# Patient Record
Sex: FEMALE | Race: BLACK OR AFRICAN AMERICAN
[De-identification: names, ages, dates, MRNs, and addresses within clinical notes are randomized per-mention and may not be internally consistent; named-entity substitution may affect disease eponyms.]

---

## 2018-03-13 ENCOUNTER — HOSPITAL ENCOUNTER (EMERGENCY)
Dept: HOSPITAL 72 - EMR | Age: 53
Discharge: HOME | End: 2018-03-13
Payer: MEDICARE

## 2018-03-13 VITALS — DIASTOLIC BLOOD PRESSURE: 68 MMHG | SYSTOLIC BLOOD PRESSURE: 120 MMHG

## 2018-03-13 VITALS — HEIGHT: 72 IN | WEIGHT: 185 LBS | BODY MASS INDEX: 25.06 KG/M2

## 2018-03-13 VITALS — DIASTOLIC BLOOD PRESSURE: 68 MMHG | SYSTOLIC BLOOD PRESSURE: 126 MMHG

## 2018-03-13 DIAGNOSIS — J40: Primary | ICD-10-CM

## 2018-03-13 DIAGNOSIS — Z86.73: ICD-10-CM

## 2018-03-13 DIAGNOSIS — Z88.0: ICD-10-CM

## 2018-03-13 LAB
ADD MANUAL DIFF: NO
ALBUMIN SERPL-MCNC: 3.7 G/DL (ref 3.4–5)
ALBUMIN/GLOB SERPL: 0.9 {RATIO} (ref 1–2.7)
ALP SERPL-CCNC: 51 U/L (ref 46–116)
ALT SERPL-CCNC: 27 U/L (ref 12–78)
ANION GAP SERPL CALC-SCNC: 11 MMOL/L (ref 5–15)
AST SERPL-CCNC: 21 U/L (ref 15–37)
BASOPHILS NFR BLD AUTO: 1.1 % (ref 0–2)
BILIRUB SERPL-MCNC: 0.4 MG/DL (ref 0.2–1)
BUN SERPL-MCNC: 20 MG/DL (ref 7–18)
CALCIUM SERPL-MCNC: 9.5 MG/DL (ref 8.5–10.1)
CHLORIDE SERPL-SCNC: 104 MMOL/L (ref 98–107)
CK MB SERPL-MCNC: 1.2 NG/ML (ref 0–3.6)
CK SERPL-CCNC: 68 U/L (ref 26–308)
CO2 SERPL-SCNC: 26 MMOL/L (ref 21–32)
CREAT SERPL-MCNC: 1.1 MG/DL (ref 0.55–1.3)
EOSINOPHIL NFR BLD AUTO: 2.2 % (ref 0–3)
ERYTHROCYTE [DISTWIDTH] IN BLOOD BY AUTOMATED COUNT: 12.2 % (ref 11.6–14.8)
GLOBULIN SER-MCNC: 4 G/DL
HCT VFR BLD CALC: 38.6 % (ref 37–47)
HGB BLD-MCNC: 12.7 G/DL (ref 12–16)
LYMPHOCYTES NFR BLD AUTO: 39.6 % (ref 20–45)
MCV RBC AUTO: 88 FL (ref 80–99)
MONOCYTES NFR BLD AUTO: 11.7 % (ref 1–10)
NEUTROPHILS NFR BLD AUTO: 45.5 % (ref 45–75)
PLATELET # BLD: 164 K/UL (ref 150–450)
POTASSIUM SERPL-SCNC: 3.4 MMOL/L (ref 3.5–5.1)
RBC # BLD AUTO: 4.36 M/UL (ref 4.2–5.4)
SODIUM SERPL-SCNC: 140 MMOL/L (ref 136–145)
WBC # BLD AUTO: 5.4 K/UL (ref 4.8–10.8)

## 2018-03-13 PROCEDURE — 71045 X-RAY EXAM CHEST 1 VIEW: CPT

## 2018-03-13 PROCEDURE — 82550 ASSAY OF CK (CPK): CPT

## 2018-03-13 PROCEDURE — 94640 AIRWAY INHALATION TREATMENT: CPT

## 2018-03-13 PROCEDURE — 99284 EMERGENCY DEPT VISIT MOD MDM: CPT

## 2018-03-13 PROCEDURE — 83880 ASSAY OF NATRIURETIC PEPTIDE: CPT

## 2018-03-13 PROCEDURE — 93005 ELECTROCARDIOGRAM TRACING: CPT

## 2018-03-13 PROCEDURE — 82553 CREATINE MB FRACTION: CPT

## 2018-03-13 PROCEDURE — 84484 ASSAY OF TROPONIN QUANT: CPT

## 2018-03-13 PROCEDURE — 36415 COLL VENOUS BLD VENIPUNCTURE: CPT

## 2018-03-13 PROCEDURE — 85025 COMPLETE CBC W/AUTO DIFF WBC: CPT

## 2018-03-13 PROCEDURE — 80053 COMPREHEN METABOLIC PANEL: CPT

## 2018-03-13 NOTE — EMERGENCY ROOM REPORT
History of Present Illness


General


Source:  Patient, Family Member, EMS





Present Illness


HPI


58-year-old female brought in by EMS with shortness of breath for 2 weeks.  

Associated with dry cough, rhinorrhea, nasal congestion.  No fevers or chills.  

No history of smoking, CAD, COPD, asthma, or CHF.  Patient states she went to 

urgent care 1 week ago and was diagnosed with "bronchitis " and was given 

albuterol inhaler and some other medications which she does not remember.


No history of PE or ACS - no recent immobilization, recent surgery, no oral 

birth control, non-smoker


Allergies:  


Coded Allergies:  


     PENICILLINS (Verified  Allergy, Unknown, 3/13/18)





Patient History


Past Medical History:  CVA/TIA


Past Surgical History:  none


Pertinent Family History:  none


Social History:  Denies: smoking, alcohol use, drug use


Pregnant Now:  No


Immunizations:  UTD


Reviewed Nursing Documentation:  PMH: Agreed, PSxH: Agreed





Review of Systems


All Other Systems:  negative except mentioned in HPI





Physical Exam


Sp02 EP Interpretation:  reviewed, normal


General Appearance:  normal inspection, well appearing, no apparent distress, 

alert, GCS 15, non-toxic


Head:  normocephalic, atraumatic


Eyes:  bilateral eye PERRL, bilateral eye EOMI


ENT:  normal ENT inspection, hearing grossly normal, normal pharynx, no 

angioedema, normal voice, TMs + canals normal, uvula midline, moist mucus 

membranes


Neck:  normal inspection, full range of motion, supple, thyroid normal, no 

meningismus, no bony tend, other - Trach scar


Respiratory:  normal inspection, lungs clear, normal breath sounds, no rhonchi, 

no respiratory distress, no retraction, no accessory muscle use, no wheezing, 

speaking full sentences


Cardiovascular #1:  regular rate, rhythm, no edema, no JVD, normal capillary 

refill


Gastrointestinal:  normal inspection, normal bowel sounds, non tender, soft, no 

mass, no peritonitis, non-distended, no guarding, no hernia, no pulsatile mass


Genitourinary:  no CVA tenderness


Musculoskeletal:  normal inspection, back normal, normal range of motion, no 

calf tenderness, pelvis stable, Kwasi's Sign negative


Neurologic:  normal inspection, alert, oriented x3, responsive, CNs III-XII nml 

as tested, motor strength/tone normal, cerebellar normal, normal gait, speech 

normal


Psychiatric:  normal inspection, judgement/insight normal, mood/affect normal, 

no suicidal/homicidal ideation, no delusions


Skin:  normal inspection, normal color, no rash


Lymphatic:  normal inspection, no adenopathy





Medical Decision Making


Diagnostic Impression:  


 Primary Impression:  


 SOB (shortness of breath)


 Additional Impression:  


 Bronchitis


ER Course


VSS, afebrile


DDx includes bronchitis more likely


ACS PE less likely given associated cough, URI symptoms, symptoms lasting 2 

weeks





ECG:


CXR: No PNA, PTX, cardiomegaly or CHF


- Unlikely CHF given no CHF on CXR, no history of CHF, no rales on exam, O2 sat 

100% on RA


- Likely bronchitis given improvement with nebs, steroids


- Low suspicion for PE given no hypoxia, no tachycardia, no tachypnea, no sinus 

tach, no rightward strain on ECG, no [id number] pattern


- Unlikely PNA given afebrile, no leuks, no PNA on CXR





Reassured patient


She has albuterol inhaler already


Rx for prednisone


Advised cough can last for weeks with bronchitis


Advised close PMD followup








ER course:


Patient has remained stable during ED stay.





Disposition:





Patient is to be discharged to home.


Prescriptions given are prednisone, promethazine, codeine


Patient is instructed to follow up with their primary care doctor within 5 

days. 


Strict return precautions discussed with patient such as fever, chills, 

worsening/severe pain, nausea, vomiting, which may indicate severe illness. 

Patient verbalizes understanding and agrees with plan. 





Please note that this Emergency Department Report was dictated using [employer] technology software, occasionally this can lead to 

erroneous entry secondary to interpretation by the dictation equipment


EKG Diagnostic Results


Rate:  normal


Rhythm:  NSR


ST Segments:  no acute changes


ASA given to the pt in ED:  No





Rhythm Strip Diag. Results


EP Interpretation:  yes


Rate:  77


Rhythm:  NSR, no PVC's, no ectopy





Chest X-Ray Diagnostic Results


Chest X-Ray Diagnostic Results :  


   Chest X-Ray Ordered:  Yes


   # of Views/Limited/Complete:  1 View


   Indication:  Shortness of Breath


   EP Interpretation:  Yes


   Interpretation:  no consolidation, no effusion, no pneumothorax, no acute 

cardiopulmonary disease


   Impression:  No acute disease


Status:  improved


Disposition:  HOME, SELF-CARE


Scripts


Prednisone* (PREDNISONE*) 20 Mg Tablet


40 MG ORAL DAILY for 4 Days, #8 TAB


   Prov: MEGAN RAHMAN M.D.         3/13/18 


Promethazine HCl/Codeine (Prometh-Codein 6.25-10 mg/5 ml) 5 Ml Syrup


5 ML PO TID for For Cough for 7 Days, #1 UNIT


   Prov: MEGAN RAHMAN M.D.         3/13/18











MEGAN RAHMAN M.D. Mar 13, 2018 06:48

## 2018-03-13 NOTE — DIAGNOSTIC IMAGING REPORT
Indication: Shortness of breath

 

Technique: One view of the chest

 

Comparison: none

 

Findings: Lungs and pleural spaces are clear. The heart size is normal

 

Impression: No acute process

 

This agrees with the preliminary interpretation provided by the emergency room

physician

## 2018-03-14 NOTE — CARDIOLOGY REPORT
--------------- APPROVED REPORT --------------





EKG Measurement

Heart [id number]

[id number]

[id number]

[id number]

[id number]





Sinus rhythm with premature atrial complexes

Otherwise normal ECG